# Patient Record
(demographics unavailable — no encounter records)

---

## 2025-02-10 NOTE — ASSESSMENT
[FreeTextEntry1] : 51yo female presents with left shoulder pain after falling on ice and landing directly on shoulder   X-rays reviewed - unremarkable  Will get MRI L sh to eval for occult fracture vs tear  Very guarded during exam and weak with rotator cuff testing  No risky activity  Use of cryotherapy discussed  Ibuprofen 600mg rx sent - r/b/a discussed - instructed how to take medication  Prognosis uncertain at this time  Discussed possible need for arthroscopic surgery depending on MRI results  RTC after MRI to review   I am working today under the supervision of Dr. Mahmood and I am following the plan of care of Dr. Mahmood as described by him on this date. Progress note completed by Briana Whitehead PA-C under the supervision of Dr. Mahmood.

## 2025-02-10 NOTE — IMAGING
[Right] : right shoulder [There are no fractures, subluxations or dislocations. No significant abnormalities are seen] : There are no fractures, subluxations or dislocations. No significant abnormalities are seen [de-identified] : Shoulder Exam: Inspection: No swelling, no ecchymosis, no deformity, no scapular winging Palpation: No tenderness to palpation over shoulder, AC joint or trapezius Range of motion: Patient extremely guarded during exam  Strength: Unable to assess due to pain  Special testing: Negative Corbin test, positive impingement sign, negative Norris test, speeds and Yergason negative Motor and sensory intact distally

## 2025-02-10 NOTE — HISTORY OF PRESENT ILLNESS
[6] : 6 [Burning] : burning [Intermittent] : intermittent [Sleep] : sleep [Meds] : meds [de-identified] : 2.10.25 Patient here for left shoulder pain. Patient states pain started 2 weeks ago when patient fell. Her right hand is her dominant hand. Patient states she had a cortisone injection on 1.27.25 with no relief [FreeTextEntry9] : icyhot

## 2025-02-21 NOTE — IMAGING
[de-identified] : Shoulder Exam: Inspection: No swelling, no ecchymosis, no deformity, no scapular winging Palpation: No tenderness to palpation over shoulder, AC joint or trapezius Range of motion: Patient extremely guarded during exam  Strength: Unable to assess due to pain  Special testing: Negative Corbin test, positive impingement sign, negative Norris test, speeds and Yergason negative Motor and sensory intact distally

## 2025-02-21 NOTE — HISTORY OF PRESENT ILLNESS
[6] : 6 [Burning] : burning [Intermittent] : intermittent [Sleep] : sleep [Meds] : meds [de-identified] : 2.10.25 Patient here for left shoulder pain. Patient states pain started 2 weeks ago when patient fell. Her right hand is her dominant hand. Patient states she had a cortisone injection on 1.27.25 with no relief.  2/21/25: here to follow up on MRI results for left shoulder. Notes continued pain with lifting/extending back. Tried Ibuprofen 600 MG, and was unable to continue due to upset stomach.  [FreeTextEntry9] : icyhot

## 2025-02-21 NOTE — ASSESSMENT
[FreeTextEntry1] : I personally reviewed and interpreted the MRI images in detail.  Shoulder contusion with associated large bursal effusion after fall onto outstretched hand.  No large rotator cuff tear noted.  Provider reassurance.  Formal course of physical therapy is advised.  She will continue with the ibuprofen 600 mg prescription as needed as prescribed.  Side effects reviewed again.  Corticosteroid injection administer for relief as she remains very acutely symptomatic.  Side effects reviewed.  Follow-up 4 to 6 weeks to reassess.  The patient's current medication management of their orthopedic diagnosis was reviewed today: There is a moderate risk of morbidity with further treatment, especially from use of prescription strength medications and possible side effects of these medications which include upset stomach with oral medications, skin reactions to topical medications and cardiac/renal/diabetes issues with long term use.

## 2025-03-17 NOTE — HISTORY OF PRESENT ILLNESS
[6] : 6 [Burning] : burning [Intermittent] : intermittent [Sleep] : sleep [Meds] : meds [de-identified] : 2.10.25 Patient here for left shoulder pain. Patient states pain started 2 weeks ago when patient fell. Her right hand is her dominant hand. Patient states she had a cortisone injection on 1.27.25 with no relief.  2/21/25: here to follow up on MRI results for left shoulder. Notes continued pain with lifting/extending back. Tried Ibuprofen 600 MG, and was unable to continue due to upset stomach.   3.17.25 Patient here for left shoulder pain. Patient states she received minor relief which last a week or so. [FreeTextEntry9] : icyhot

## 2025-03-17 NOTE — IMAGING
[de-identified] : Shoulder Exam: Inspection: No swelling, no ecchymosis, no deformity, no scapular winging Palpation: tenderness to palpation globally over shoulder, AC joint or trapezius Range of motion: Patient extremely guarded during exam  Strength: Unable to assess due to pain  Special testing: positive Corbin test, positive impingement sign, negative Norris test, speeds and Yergason negative Motor and sensory intact distally

## 2025-03-17 NOTE — ASSESSMENT
[FreeTextEntry1] : here for follow-up of left shoulder contusion, rotator cuff sprain, subacromial bursitis   Has now developed adhesive capsulitis of left shoulder  Received CSI on Feb 21st with relief for a few days Can continue ibuprofen 600mg - side effects reviewed - instructed how to take medication - instructed to stop medication if GI upset occurs Use of cryotherapy discussed  Discussed importance of PT for regaining ROM  RTC in 6 weeks   I am working today under the supervision of Dr. Mahmood and I am following the plan of care of Dr. Mahmood as described by him on this date. Progress note completed by Briana Whitehead PA-C under the supervision of Dr. Mahmood.

## 2025-06-12 NOTE — ASSESSMENT
[FreeTextEntry1] : I reviewed all notes and records from the previous provider.  I personally reviewed and interpreted the MRI images in detail.  This consistent with chronic patellofemoral arthritis exacerbation and surrounding soft tissue swelling and effusion from the flareup.  We had a long discussion about the diagnosis of arthritis.  Need for weight loss discussed.  Need for low-impact exercise discussed.  Formal course of physical therapy advised.  She will continue with the Celebrex 200 mg prescription in the interim for maintenance therapy.  We discussed corticosteroid injection or viscosupplementation down the line if needed.  The patient's current medication management of their orthopedic diagnosis was reviewed today: There is a moderate risk of morbidity with further treatment, especially from use of prescription strength medications and possible side effects of these medications which include upset stomach with oral medications, skin reactions to topical medications and cardiac/renal/diabetes issues with long term use.

## 2025-06-12 NOTE — IMAGING
[de-identified] : Knee Exam:  Inspection: No effusion, no warmth, normal Q angle Palpation: Anterior tenderness to palpation Range of motion: 0-140; anterior pain with flexion;  +crepitus Strength: 5/5 quadriceps and hamstring strength Special testing: Negative Lachman, negative Su, negative patella apprehension Neuro: Motor and sensory intact distally Gait: Non-antalgic

## 2025-06-12 NOTE — HISTORY OF PRESENT ILLNESS
[de-identified] : 06/12/2025 JENNIFER HAMILTON is a 50-year y/o F here for an evaluation of her RT knee per BOWEN Adorno. Reports that pain has been going on for 3 weeks. Has MRI results ready to review.      [] : no

## 2025-06-19 NOTE — ADDENDUM
[FreeTextEntry1] : I, Sydney Lockhart acted solely as a scribe for Dr. Yudelka Bird on this date 06/19/2025.    All medical record entries made by the Scribe were at my, Dr. Yudelka Bird, direction and personally dictated by me on 06/19/2025. I have reviewed the chart and agree that the record accurately reflects my personal performance of the history, physical exam, assessment and plan. I have also personally directed, reviewed, and agreed with the chart.

## 2025-06-19 NOTE — ASSESSMENT
[FreeTextEntry1] :  50 year old F with a longstanding history of obesity presents for initial consultation for weight-loss management.   I had a lengthy discussion with the patient regarding nutrition, exercise, weight loss medications, and bariatric surgery. The patient qualifies for bariatric surgery but is not interested at this time. We discussed how bariatric surgery may offer greater weight loss results with better long-term success. Patient qualifies for and is currently most interested in weight loss medications.   Health maintenance and behavioral/nutrition counseling for obesity: An additional 30 minutes of the visit was spent counseling the patient regarding need for behavior modification including nutrition and proper eating habits, including which foods to eat and avoid.   I emphasized the importance of making healthier food choices including fresh fruits and vegetables, lean meats, and protein sources. I recommended front loading calories, incorporating whole grains, and eliminating fast foods. I also discussed the importance of avoiding fried/fatty foods and foods containing high sugar content including juices/shakes/sodas/desserts. Preprinted protein source information list given to the patient.   I also encouraged beginning an exercise program and recommended cardiovascular exercises along with strength training to build lean muscle.   Patient will work on the following: -Eliminate snacks -Focus on eating 3 well-balanced protein focused meals during the daytime with appropriate portion size -Cooking fresh meals rather than take out/processed/ready-made foods -Water 64 ounces per day, limit liquid calories -Weigh self 1-2 times per week -Stress reduction -Incorporating exercise; walk 8-10k steps daily, incorporate strength training -Obtain bloodwork  Patient met with nutritionist Veronica Edward at today's visit, 15 minutes   I have discussed initiating Zepbound therapy for pt. All risks and benefits have been discussed with the patient.    Currently there are no contraindications for the use of ZEPBOUND after reviewing pts medical history and labs including personal or family history of thyroid cancer or MEN2. Possible side effects were discussed with the patient including nausea, reflux, abdominal pain, vomiting, constipation, kidney stones, delayed gastric emptying, pancreatitis, gallstones, vision changes or mood changes.  Patient understands that there have been reports of vision changes including blindness in patients on GLP-1's.      Pt verbalizes understanding and wishes to proceed with medical therapy. Start Zepbound based on authorization and tolerance. Patient educated to call with questions/concerns. All questions answered.   Return to office 3 weeks after starting Zepbound     Additional time spent before and after visit reviewing chart.

## 2025-06-19 NOTE — PHYSICAL EXAM
[Normal] : PERRL, EOMI, no conjunctival infection, anicteric [de-identified] : soft, non-tender, non-distended, no hernia, no diastasis

## 2025-06-19 NOTE — REVIEW OF SYSTEMS
[Fatigue] : fatigue [Recent Change In Weight] : ~T recent weight change [Joint Pain] : joint pain [Negative] : Allergic/Immunologic [FreeTextEntry9] : Knee pain [de-identified] : Headaches

## 2025-06-19 NOTE — HISTORY OF PRESENT ILLNESS
[de-identified] : 50 year old F with a longstanding history of obesity presents for initial consultation for weight-loss management. Pt reports weight gain after giving birth to her second child.  Patient also feels that stress has contributed to weight gain currently she has been under a lot of stress her mother  a year and a half ago, she is taking care of her father.  Pt has tried various diets and exercise programs with limited long-term success. Pt has previously tried Phentermine and Wegovy, and interested in restarting injectable weight loss medications.   Heaviest/current weight: 235 lbs Lowest weight: 128 lbs Goal weight: 140 lbs  Diets/exercise programs tried in the past: More frequently going to the gym Weight loss medication tried in the past: Phentermine and Wegovy Reason for stopping well work weight loss medication if applicable: phentermine (sleeping problem, fast heart rate) and provider stopped prescribing Wegovy   History of bariatric surgery: No Current anti-obesity medications: None Anti-obesity medications side effects: N/A Obesity comorbidities: Increased cholesterol 234, increased triglycerides 304 Comorbidities improved/resolved: N/A  Person or Family History of any of the following: MEN syndrome: No Medullary thyroid cancer: No  Personal history of: Recent visual changes: No Glaucoma: No Pancreatitis: No Gallstones: No Kidney stones: No Eating disorders: No Anxiety: Yes Seizures: No Wellbutrin: No Uncontrolled blood pressure: No  Current dietary lifestyle Breakfast: Cereal, yogurt, or eggs Lunch: Yogurt or turkey sandwich Dinner: Pasta, chicken, or meat with 1 vegetable Snacks: Pretzels, grapes, cheese/yogurt Drinks: Two cups of coffee and water (80 oz.)  Activity Lifestyle Sleep: 6-7 hours Physical activity/exercise: Walking (10mins/day) 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